# Patient Record
Sex: FEMALE | Race: WHITE
[De-identification: names, ages, dates, MRNs, and addresses within clinical notes are randomized per-mention and may not be internally consistent; named-entity substitution may affect disease eponyms.]

---

## 2017-09-25 ENCOUNTER — HOSPITAL ENCOUNTER (OUTPATIENT)
Dept: HOSPITAL 62 - 2S | Age: 27
Setting detail: OBSERVATION
LOS: 2 days | Discharge: HOME | End: 2017-09-27
Attending: OBSTETRICS & GYNECOLOGY | Admitting: OBSTETRICS & GYNECOLOGY
Payer: COMMERCIAL

## 2017-09-25 DIAGNOSIS — Z3A.01: ICD-10-CM

## 2017-09-25 DIAGNOSIS — O23.01: Primary | ICD-10-CM

## 2017-09-25 LAB
ANION GAP SERPL CALC-SCNC: 12 MMOL/L (ref 5–19)
APPEARANCE UR: (no result)
BASOPHILS # BLD AUTO: 0.1 10^3/UL (ref 0–0.2)
BASOPHILS NFR BLD AUTO: 0.6 % (ref 0–2)
BILIRUB UR QL STRIP: NEGATIVE
BUN SERPL-MCNC: 10 MG/DL (ref 7–20)
CALCIUM: 9.3 MG/DL (ref 8.4–10.2)
CHLORIDE SERPL-SCNC: 106 MMOL/L (ref 98–107)
CO2 SERPL-SCNC: 19 MMOL/L (ref 22–30)
CREAT SERPL-MCNC: 0.92 MG/DL (ref 0.52–1.25)
EOSINOPHIL # BLD AUTO: 0.1 10^3/UL (ref 0–0.6)
EOSINOPHIL NFR BLD AUTO: 0.5 % (ref 0–6)
ERYTHROCYTE [DISTWIDTH] IN BLOOD BY AUTOMATED COUNT: 14.8 % (ref 11.5–14)
GLUCOSE SERPL-MCNC: 103 MG/DL (ref 75–110)
GLUCOSE UR STRIP-MCNC: NEGATIVE MG/DL
HCT VFR BLD CALC: 39.8 % (ref 36–47)
HGB BLD-MCNC: 13.7 G/DL (ref 12–15.5)
HGB HCT DIFFERENCE: 1.3
KETONES UR STRIP-MCNC: (no result) MG/DL
LYMPHOCYTES # BLD AUTO: 1.5 10^3/UL (ref 0.5–4.7)
LYMPHOCYTES NFR BLD AUTO: 11 % (ref 13–45)
MCH RBC QN AUTO: 27.6 PG (ref 27–33.4)
MCHC RBC AUTO-ENTMCNC: 34.4 G/DL (ref 32–36)
MCV RBC AUTO: 80 FL (ref 80–97)
MONOCYTES # BLD AUTO: 0.7 10^3/UL (ref 0.1–1.4)
MONOCYTES NFR BLD AUTO: 5.2 % (ref 3–13)
NEUTROPHILS # BLD AUTO: 11.3 10^3/UL (ref 1.7–8.2)
NEUTS SEG NFR BLD AUTO: 82.7 % (ref 42–78)
NITRITE UR QL STRIP: NEGATIVE
PH UR STRIP: 5 [PH] (ref 5–9)
POTASSIUM SERPL-SCNC: 3.9 MMOL/L (ref 3.6–5)
PROT UR STRIP-MCNC: NEGATIVE MG/DL
RBC # BLD AUTO: 4.97 10^6/UL (ref 3.72–5.28)
SODIUM SERPL-SCNC: 136.7 MMOL/L (ref 137–145)
SP GR UR STRIP: 1.03
UROBILINOGEN UR-MCNC: NEGATIVE MG/DL (ref ?–2)
WBC # BLD AUTO: 13.7 10^3/UL (ref 4–10.5)

## 2017-09-25 PROCEDURE — G0379 DIRECT REFER HOSPITAL OBSERV: HCPCS

## 2017-09-25 PROCEDURE — 81001 URINALYSIS AUTO W/SCOPE: CPT

## 2017-09-25 PROCEDURE — G0378 HOSPITAL OBSERVATION PER HR: HCPCS

## 2017-09-25 PROCEDURE — 87040 BLOOD CULTURE FOR BACTERIA: CPT

## 2017-09-25 PROCEDURE — 83605 ASSAY OF LACTIC ACID: CPT

## 2017-09-25 PROCEDURE — 87086 URINE CULTURE/COLONY COUNT: CPT

## 2017-09-25 PROCEDURE — 36415 COLL VENOUS BLD VENIPUNCTURE: CPT

## 2017-09-25 PROCEDURE — 85025 COMPLETE CBC W/AUTO DIFF WBC: CPT

## 2017-09-25 PROCEDURE — 80048 BASIC METABOLIC PNL TOTAL CA: CPT

## 2017-09-25 PROCEDURE — S0119 ONDANSETRON 4 MG: HCPCS

## 2017-09-25 RX ADMIN — ZOLPIDEM TARTRATE PRN MG: 5 TABLET ORAL at 23:59

## 2017-09-25 RX ADMIN — ONDANSETRON PRN MG: 4 TABLET, ORALLY DISINTEGRATING ORAL at 22:26

## 2017-09-25 RX ADMIN — OXYCODONE AND ACETAMINOPHEN PRN TAB: 5; 325 TABLET ORAL at 22:18

## 2017-09-26 RX ADMIN — ONDANSETRON PRN MG: 4 TABLET, ORALLY DISINTEGRATING ORAL at 21:50

## 2017-09-26 RX ADMIN — CEFTRIAXONE SCH ML: 2 INJECTION, SOLUTION INTRAVENOUS at 10:04

## 2017-09-26 RX ADMIN — OXYCODONE AND ACETAMINOPHEN PRN TAB: 5; 325 TABLET ORAL at 20:56

## 2017-09-26 RX ADMIN — OXYCODONE AND ACETAMINOPHEN PRN TAB: 5; 325 TABLET ORAL at 02:19

## 2017-09-26 RX ADMIN — Medication SCH ML: at 21:46

## 2017-09-26 RX ADMIN — ONDANSETRON PRN MG: 4 TABLET, ORALLY DISINTEGRATING ORAL at 13:38

## 2017-09-26 RX ADMIN — Medication SCH ML: at 10:03

## 2017-09-26 RX ADMIN — OXYCODONE AND ACETAMINOPHEN PRN TAB: 5; 325 TABLET ORAL at 07:55

## 2017-09-26 RX ADMIN — OXYCODONE AND ACETAMINOPHEN PRN TAB: 5; 325 TABLET ORAL at 15:44

## 2017-09-26 NOTE — PDOC PROGRESS REPORT
Subjective


Subjective:: 





Pt reporting still having moderate CVA pain, no n/v


No vaginal bleeding





Physical Exam





- Physical Exam


Vital Signs: 


 











Temp Pulse Resp BP Pulse Ox


 


 98.6 F   86   16   114/78   97 


 


 09/26/17 04:04  09/26/17 04:04  09/26/17 04:04  09/26/17 04:04  09/26/17 04:04








 Intake & Output











 09/25/17 09/26/17 09/27/17





 06:59 06:59 06:59


 


Weight  99.6 kg 











General appearance: PRESENT: no acute distress, cooperative, well-nourished


GI/Abdominal exam: PRESENT: normal bowel sounds, soft - Moderate Right CVA 

tenderness, tenderness





Result


Laboratory Results: 


 





 09/25/17 22:20 





 09/25/17 22:20 





 











  09/25/17 09/25/17 09/25/17





  22:20 22:20 22:20


 


WBC  13.7 H  


 


RBC  4.97  


 


Hgb  13.7  


 


Hct  39.8  


 


MCV  80  


 


MCH  27.6  


 


MCHC  34.4  


 


RDW  14.8 H  


 


Plt Count  219  


 


Seg Neutrophils %  82.7 H  


 


Lymphocytes %  11.0 L  


 


Monocytes %  5.2  


 


Eosinophils %  0.5  


 


Basophils %  0.6  


 


Absolute Neutrophils  11.3 H  


 


Absolute Lymphocytes  1.5  


 


Absolute Monocytes  0.7  


 


Absolute Eosinophils  0.1  


 


Absolute Basophils  0.1  


 


Sodium   136.7 L 


 


Potassium   3.9 


 


Chloride   106 


 


Carbon Dioxide   19 L 


 


Anion Gap   12 


 


BUN   10 


 


Creatinine   0.92 


 


Est GFR ( Amer)   > 60 


 


Est GFR (Non-Af Amer)   > 60 


 


Glucose   103 


 


Lactic Acid    0.9


 


Calcium   9.3 


 


Urine Color   


 


Urine Appearance   


 


Urine pH   


 


Ur Specific Gravity   


 


Urine Protein   


 


Urine Glucose (UA)   


 


Urine Ketones   


 


Urine Blood   


 


Urine Nitrite   


 


Ur Leukocyte Esterase   


 


Urine WBC (Auto)   


 


Urine RBC (Auto)   














  09/25/17





  22:42


 


WBC 


 


RBC 


 


Hgb 


 


Hct 


 


MCV 


 


MCH 


 


MCHC 


 


RDW 


 


Plt Count 


 


Seg Neutrophils % 


 


Lymphocytes % 


 


Monocytes % 


 


Eosinophils % 


 


Basophils % 


 


Absolute Neutrophils 


 


Absolute Lymphocytes 


 


Absolute Monocytes 


 


Absolute Eosinophils 


 


Absolute Basophils 


 


Sodium 


 


Potassium 


 


Chloride 


 


Carbon Dioxide 


 


Anion Gap 


 


BUN 


 


Creatinine 


 


Est GFR ( Amer) 


 


Est GFR (Non-Af Amer) 


 


Glucose 


 


Lactic Acid 


 


Calcium 


 


Urine Color  YELLOW


 


Urine Appearance  SLIGHTLY-CLOUDY


 


Urine pH  5.0


 


Ur Specific Gravity  1.031


 


Urine Protein  NEGATIVE


 


Urine Glucose (UA)  NEGATIVE


 


Urine Ketones  TRACE H


 


Urine Blood  NEGATIVE


 


Urine Nitrite  NEGATIVE


 


Ur Leukocyte Esterase  NEGATIVE


 


Urine WBC (Auto)  2


 


Urine RBC (Auto)  1














Assessment & Plan





- Diagnosis


(1) Kidney infection in mother during first trimester of pregnancy


Is this a current diagnosis for this admission?: Yes   





- Time


Time Spent with patient: Less than 15 minutes


Medications reviewed and adjusted accordingly: Yes


Anticipated discharge: Home


Within: within 48 hours - Continue IV Abx Recheck cbc in AM, await urine Cx 

results

## 2017-09-27 VITALS — DIASTOLIC BLOOD PRESSURE: 70 MMHG | SYSTOLIC BLOOD PRESSURE: 115 MMHG

## 2017-09-27 RX ADMIN — CEFTRIAXONE SCH ML: 2 INJECTION, SOLUTION INTRAVENOUS at 10:24

## 2017-09-27 RX ADMIN — ZOLPIDEM TARTRATE PRN MG: 5 TABLET ORAL at 02:17

## 2017-09-27 RX ADMIN — Medication SCH ML: at 06:08

## 2017-11-09 ENCOUNTER — HOSPITAL ENCOUNTER (OUTPATIENT)
Dept: HOSPITAL 62 - LAB | Age: 27
End: 2017-11-09
Attending: OBSTETRICS & GYNECOLOGY
Payer: COMMERCIAL

## 2017-11-09 DIAGNOSIS — J02.0: Primary | ICD-10-CM

## 2017-11-09 PROCEDURE — 87880 STREP A ASSAY W/OPTIC: CPT

## 2018-05-17 ENCOUNTER — HOSPITAL ENCOUNTER (INPATIENT)
Dept: HOSPITAL 62 - LC | Age: 28
LOS: 3 days | Discharge: HOME | End: 2018-05-20
Attending: OBSTETRICS & GYNECOLOGY | Admitting: OBSTETRICS & GYNECOLOGY
Payer: COMMERCIAL

## 2018-05-17 DIAGNOSIS — O36.63X0: Primary | ICD-10-CM

## 2018-05-17 DIAGNOSIS — Z3A.39: ICD-10-CM

## 2018-05-17 DIAGNOSIS — Z87.59: ICD-10-CM

## 2018-05-17 LAB
APPEARANCE UR: (no result)
APTT PPP: YELLOW S
BARBITURATES UR QL SCN: NEGATIVE
BILIRUB UR QL STRIP: NEGATIVE
GLUCOSE UR STRIP-MCNC: NEGATIVE MG/DL
KETONES UR STRIP-MCNC: (no result) MG/DL
METHADONE UR QL SCN: NEGATIVE
NITRITE UR QL STRIP: NEGATIVE
PCP UR QL SCN: NEGATIVE
PH UR STRIP: 6 [PH] (ref 5–9)
PROT UR STRIP-MCNC: 30 MG/DL
SP GR UR STRIP: 1.03
URINE AMPHETAMINES SCREEN: NEGATIVE
URINE BENZODIAZEPINES SCREEN: NEGATIVE
URINE COCAINE SCREEN: NEGATIVE
URINE MARIJUANA (THC) SCREEN: NEGATIVE
UROBILINOGEN UR-MCNC: 2 MG/DL (ref ?–2)

## 2018-05-17 PROCEDURE — 36415 COLL VENOUS BLD VENIPUNCTURE: CPT

## 2018-05-17 PROCEDURE — 86901 BLOOD TYPING SEROLOGIC RH(D): CPT

## 2018-05-17 PROCEDURE — 85027 COMPLETE CBC AUTOMATED: CPT

## 2018-05-17 PROCEDURE — 86900 BLOOD TYPING SEROLOGIC ABO: CPT

## 2018-05-17 PROCEDURE — 87880 STREP A ASSAY W/OPTIC: CPT

## 2018-05-17 PROCEDURE — 86850 RBC ANTIBODY SCREEN: CPT

## 2018-05-17 PROCEDURE — 87804 INFLUENZA ASSAY W/OPTIC: CPT

## 2018-05-17 PROCEDURE — 81005 URINALYSIS: CPT

## 2018-05-17 PROCEDURE — 80307 DRUG TEST PRSMV CHEM ANLYZR: CPT

## 2018-05-17 PROCEDURE — 87070 CULTURE OTHR SPECIMN AEROBIC: CPT

## 2018-05-17 PROCEDURE — 85025 COMPLETE CBC W/AUTO DIFF WBC: CPT

## 2018-05-18 LAB
ABSOLUTE LYMPHOCYTES# (MANUAL): 0.3 10^3/UL (ref 0.5–4.7)
ABSOLUTE MONOCYTES # (MANUAL): 0.2 10^3/UL (ref 0.1–1.4)
ABSOLUTE NEUTROPHILS# (MANUAL): 9.1 10^3/UL (ref 1.7–8.2)
ADD MANUAL DIFF: YES
ANISOCYTOSIS BLD QL SMEAR: SLIGHT
BASOPHILS NFR BLD MANUAL: 0 % (ref 0–2)
DACRYOCYTES BLD QL SMEAR: SLIGHT
EOSINOPHIL NFR BLD MANUAL: 0 % (ref 0–6)
ERYTHROCYTE [DISTWIDTH] IN BLOOD BY AUTOMATED COUNT: 15.6 % (ref 11.5–14)
HCT VFR BLD CALC: 33.3 % (ref 36–47)
HGB BLD-MCNC: 11.3 G/DL (ref 12–15.5)
MCH RBC QN AUTO: 29.5 PG (ref 27–33.4)
MCHC RBC AUTO-ENTMCNC: 34 G/DL (ref 32–36)
MCV RBC AUTO: 87 FL (ref 80–97)
MONOCYTES % (MANUAL): 2 % (ref 3–13)
NEUTS BAND NFR BLD MANUAL: 2 % (ref 3–5)
OVALOCYTES BLD QL SMEAR: SLIGHT
PLATELET # BLD: 134 10^3/UL (ref 150–450)
PLATELET COMMENT: ADEQUATE
POIKILOCYTOSIS BLD QL SMEAR: SLIGHT
RBC # BLD AUTO: 3.83 10^6/UL (ref 3.72–5.28)
SEGMENTED NEUTROPHILS % (MAN): 93 % (ref 42–78)
TOTAL CELLS COUNTED BLD: 100
TOXIC GRANULES BLD QL SMEAR: SLIGHT
VARIANT LYMPHS NFR BLD MANUAL: 3 % (ref 13–45)
WBC # BLD AUTO: 9.6 10^3/UL (ref 4–10.5)

## 2018-05-18 PROCEDURE — 4A1HXCZ MONITORING OF PRODUCTS OF CONCEPTION, CARDIAC RATE, EXTERNAL APPROACH: ICD-10-PCS | Performed by: OBSTETRICS & GYNECOLOGY

## 2018-05-18 RX ADMIN — OXYCODONE AND ACETAMINOPHEN PRN TAB: 5; 325 TABLET ORAL at 19:16

## 2018-05-18 RX ADMIN — HYDROMORPHONE HYDROCHLORIDE ONE MG: 2 INJECTION INTRAMUSCULAR; INTRAVENOUS; SUBCUTANEOUS at 10:50

## 2018-05-18 RX ADMIN — CEFTRIAXONE SODIUM SCH MG: 1 INJECTION, POWDER, FOR SOLUTION INTRAMUSCULAR; INTRAVENOUS at 22:33

## 2018-05-18 RX ADMIN — HYDROMORPHONE HYDROCHLORIDE ONE MG: 2 INJECTION INTRAMUSCULAR; INTRAVENOUS; SUBCUTANEOUS at 11:56

## 2018-05-18 RX ADMIN — OXYCODONE AND ACETAMINOPHEN PRN TAB: 5; 325 TABLET ORAL at 13:28

## 2018-05-18 RX ADMIN — Medication SCH CAP: at 13:14

## 2018-05-18 RX ADMIN — DOCUSATE SODIUM SCH MG: 100 CAPSULE, LIQUID FILLED ORAL at 13:14

## 2018-05-18 RX ADMIN — DOCUSATE SODIUM SCH MG: 100 CAPSULE, LIQUID FILLED ORAL at 17:29

## 2018-05-18 RX ADMIN — MORPHINE SULFATE PRN MG: 10 INJECTION INTRAMUSCULAR; INTRAVENOUS; SUBCUTANEOUS at 15:25

## 2018-05-18 NOTE — OPERATIVE REPORT E
Operative Report



NAME: MARTI LEW

MRN:  [id number]          : 1990 AGE:  28Y

DATE OF SURGERY: 2018               ROOM: 



PREOPERATIVE DIAGNOSIS:

IUP at term with prior history of shoulder dystocia.



POSTOPERATIVE DIAGNOSIS:

IUP at term with prior history of shoulder dystocia.



OPERATION:

Primary low transverse , delivery of a viable male, 9 pounds 7

ounces, 8 and 9 Apgars.



SURGEON:

RE CROCKETT M.D.



ANESTHESIA:

Spinal



ESTIMATED BLOOD LOSS:

Approximately 1500 mL.



TISSUE REMOVED OR ALTERED:

Placenta.



PROCEDURE:

The patient was placed in the supine position, rolled on her right side,

prepped and draped in sterile fashion.



A Pfannenstiel incision was made.  The incision extended through the

subcutaneous tissue and fascia with sharp dissection.  Fascia sharply

divided.  Rectus muscle bluntly and sharply divided.  Parietal peritoneum

was entered with sharp dissection.  The uterus was nicked in the midline

and extended bilaterally.  The infant was delivered through the uterine

and abdominal incision.  Nose and mouth suctioned with bulb syringe.  Cord

was clamped and infant was passed from the table.  Placenta was manually

extracted.  Uterus closed in 2 layers using 0 Vicryl, first in a running

stitch, then a second a Lembert stitch imbricating the first layer.  There

was a mild amount of bleeding noted on the left and was controlled with a

figure-of-eight suture of 0 Vicryl.  The fascia was closed with 0 Vicryl

and the skin was closed with subcuticular absorbable staples.  Patient

tolerated it well, was taken to recovery in good condition and infant to

the  nursery in good condition.





DICTATING PHYSICIAN:  RE CROCKETT M.D.





[id number]                  DT: [id number]

PHY#: [id number]            DD: [id number]

ID:   [id number]           JOB#: [id number]       ACCT: [id number]



cc:RE CROCKETT M.D.

>

## 2018-05-18 NOTE — ADMISSION PHYSICAL
=================================================================



=================================================================

Datetime Report Generated by CPN: 2018 01:11

   

   

=================================================================

CURRENT ADMISSION

=================================================================

   

Chief Complaint:  Other

Chief Complaint Other:  Large baby with efw 4400.  She is concerned

   about shoulder dystocia and would prefer a c section over labor or

   induction.  She reports having problems with delivery of the

   shoulders of a smaller baby.

Indication for Induction:  Not Applicable

Admit Impression :  Term, Intrauterine Pregnancy

Admit Plan:  Admit to Unit; Initiate  Section Protocol

   

=================================================================

ALLERGIES

=================================================================

   

Medication Allergies:  No

Medication Allergies:  No Known Allergies (2016)

Latex:  No Latex Allergies

   

=================================================================

OBSTETRICAL HISTORY

=================================================================

   

EDC:  2018 00:00

:  4

Para:  2

Term:  2

:  0

SAB:  1

IAB:  0

Ectopic:  0

Livin

Cesareans:  0

VBACs:  0

Multiple Births:  0

   

=================================================================

PHYSICAL EXAM

=================================================================

   

General:  Normal

HEENT:  Normal

Neurologic:  Normal

Thyroid:  Normal

Heart:  Normal

Lungs:  Normal

Breast:  Deferred

Back:  Normal

Abdomen:  Normal

Genitourinary Exam:  Normal

Extremities:  Normal

DTRs:  Normal

Pelvic Type:  Adequate

Vital Signs:  Reviewed

   

=================================================================

VAGINAL EXAM

=================================================================

   

Dilatation:  1

Effacement:  25

Station:  -4

   

=================================================================

MEMBRANES

=================================================================

   

Pooling:  Negative

Membranes:  Intact

   

=================================================================

FETUS A

=================================================================

   

EGA:  39.2

Monitoring:  External US

FHR- Baseline:  140

Variability:  Moderate 6-25bpm

FHR Category:  Category I

Estimated Fetal Weight (gm):  4400

Fetal Presentation:  Vertex

Admit Comment:  She would prefer a c section over induction or natural

   labor.  We will plan for the am.

   

=================================================================

INFORMED CONSENT

=================================================================

   

Signature:  Electronically signed by Missael Gonsales MD ([email or web address]) on

   2018 at 01:11  with User ID: [id number]

## 2018-05-19 LAB
A TYPE INFLUENZA AG: NEGATIVE
B INFLUENZA AG: NEGATIVE
ERYTHROCYTE [DISTWIDTH] IN BLOOD BY AUTOMATED COUNT: 15.8 % (ref 11.5–14)
HCT VFR BLD CALC: 28.7 % (ref 36–47)
HGB BLD-MCNC: 10.1 G/DL (ref 12–15.5)
MCH RBC QN AUTO: 30 PG (ref 27–33.4)
MCHC RBC AUTO-ENTMCNC: 35 G/DL (ref 32–36)
MCV RBC AUTO: 86 FL (ref 80–97)
PLATELET # BLD: 122 10^3/UL (ref 150–450)
RBC # BLD AUTO: 3.35 10^6/UL (ref 3.72–5.28)
WBC # BLD AUTO: 8.6 10^3/UL (ref 4–10.5)

## 2018-05-19 RX ADMIN — OXYCODONE AND ACETAMINOPHEN PRN TAB: 5; 325 TABLET ORAL at 00:10

## 2018-05-19 RX ADMIN — OXYCODONE AND ACETAMINOPHEN PRN TAB: 5; 325 TABLET ORAL at 22:44

## 2018-05-19 RX ADMIN — MORPHINE SULFATE PRN MG: 10 INJECTION INTRAMUSCULAR; INTRAVENOUS; SUBCUTANEOUS at 04:25

## 2018-05-19 RX ADMIN — OXYCODONE AND ACETAMINOPHEN PRN TAB: 5; 325 TABLET ORAL at 14:48

## 2018-05-19 RX ADMIN — Medication SCH CAP: at 09:23

## 2018-05-19 RX ADMIN — CEFTRIAXONE SODIUM SCH MG: 1 INJECTION, POWDER, FOR SOLUTION INTRAMUSCULAR; INTRAVENOUS at 09:22

## 2018-05-19 RX ADMIN — DOCUSATE SODIUM SCH MG: 100 CAPSULE, LIQUID FILLED ORAL at 17:12

## 2018-05-19 RX ADMIN — OXYCODONE AND ACETAMINOPHEN PRN TAB: 5; 325 TABLET ORAL at 18:41

## 2018-05-19 RX ADMIN — OXYCODONE AND ACETAMINOPHEN PRN TAB: 5; 325 TABLET ORAL at 10:23

## 2018-05-19 RX ADMIN — OXYCODONE AND ACETAMINOPHEN PRN TAB: 5; 325 TABLET ORAL at 06:33

## 2018-05-19 RX ADMIN — CEFTRIAXONE SODIUM SCH MG: 1 INJECTION, POWDER, FOR SOLUTION INTRAMUSCULAR; INTRAVENOUS at 22:22

## 2018-05-19 RX ADMIN — DOCUSATE SODIUM SCH MG: 100 CAPSULE, LIQUID FILLED ORAL at 09:23

## 2018-05-20 VITALS — SYSTOLIC BLOOD PRESSURE: 136 MMHG | DIASTOLIC BLOOD PRESSURE: 84 MMHG

## 2018-05-20 RX ADMIN — IBUPROFEN SCH MG: 800 TABLET, FILM COATED ORAL at 11:48

## 2018-05-20 RX ADMIN — OXYCODONE AND ACETAMINOPHEN PRN TAB: 5; 325 TABLET ORAL at 02:54

## 2018-05-20 RX ADMIN — OXYCODONE AND ACETAMINOPHEN PRN TAB: 5; 325 TABLET ORAL at 07:29

## 2018-05-20 RX ADMIN — DOCUSATE SODIUM SCH MG: 100 CAPSULE, LIQUID FILLED ORAL at 09:30

## 2018-05-20 RX ADMIN — Medication SCH CAP: at 09:30

## 2018-05-20 RX ADMIN — IBUPROFEN SCH MG: 800 TABLET, FILM COATED ORAL at 06:43

## 2018-05-20 NOTE — PDOC DISCHARGE SUMMARY
Final Diagnosis


Discharge Date: 18





- Final Diagnosis


(1)  delivery delivered


Is this a current diagnosis for this admission?: Yes   





(2) Gestational hypertension with significant proteinuria, delivered


Is this a current diagnosis for this admission?: Yes   





(3) Kidney infection in mother during first trimester of pregnancy


Is this a current diagnosis for this admission?: Yes   





Discharge Data





- Discharge Medication


Prescriptions: 


Oxycodone HCl/Acetaminophen [Percocet 5-325 mg Tablet] 2 tab PO Q4HP PRN #30 

tablet


 PRN Reason: 


Docusate Sodium [Colace 100 mg Capsule] 100 mg PO BID #60 capsule


Ferrous Sulfate 325 mg PO BID #60 tablet.


Ibuprofen [Motrin 800 mg Tablet] 800 mg PO Q6 #60 tablet


Home Medications: 








Docusate Sodium [Colace 100 mg Capsule] 100 mg PO BID #60 capsule 18 


Ferrous Sulfate 325 mg PO BID #60 tablet. 18 


Ibuprofen [Motrin 800 mg Tablet] 800 mg PO Q6 #60 tablet 18 


Oxycodone HCl/Acetaminophen [Percocet 5-325 mg Tablet] 2 tab PO Q4HP PRN #30 

tablet 18 








Gestational Age: 39


Reason(s) for Admission: Ceasarean Section-Primary


Prenatal Procedures: NST


Intrapartum Procedure(s): : Low Cervical, Transverse





-  Data


  ** Baby 1 Male


Apgar at 1 minute: 8


Apgar at 5 minutes: 9


Weight: 4.281 kg


Home with Mother: Yes


Complications: No





- Diagnosis Test


Laboratory: 


 











Temp Pulse Resp BP Pulse Ox


 


 98.3 F   115 H  18   126/69 H  97 


 


 18 00:08  18 23:46  18 23:46  18 23:46  18 23:46








 











  18





  22:59 00:02 06:10


 


RBC   3.83  3.35 L


 


Hgb   11.3 L  10.1 L


 


Hct   33.3 L  28.7 L


 


Urine Opiates Screen  NEGATIVE  














- Discharge information/Instructions


Discharge Activity: Activity As Tolerated, Pelvic Rest, No tub bath


Discharge Diet: Regular


Disposition: HOME, SELF-CARE


Follow up with: [facility]


in: 1, Weeks

## 2019-08-20 NOTE — PDOC PROGRESS REPORT
Subjective-OB


Progress Note for:: 18


Subjective: 





s/p c/s day #1,





Denies concerns, states lochia is moderately stable, pain is well controlled, 

crawford to gravity, still achy . 





Physical Exam (OB)


Vital Signs: 


 











Temp Pulse Resp BP Pulse Ox


 


 98.8 F   134 H  20   111/75   98 


 


 18 07:55  18 07:55  18 07:55  18 07:55  18 07:55








 Intake & Output











 18





 06:59 06:59 06:59


 


Intake Total  3568 1240


 


Output Total  5752 


 


Balance  -2184 1240


 


Weight 120.6 kg  














- PIH/Pre-Eclampsia


DTR's: 2 +


Clonus: Negative


Headache: Absent


Epigastric Pain: No


Visual Changes: No





- 


Dressing Removed: No


Incision: Dressing


Closure Type: Sutures





- Lochia


Lochia Amount: Scant < 10 ml


Lochia Color: Rubra/Red





- Abdomen


Description: Tender, Soft, Round


Hernia Present: No


Fundal Description: Firm, Midline


Fundal Height: u/u - u/2





Objective-Diagnostic


Laboratory: 


 





 18 06:10 





 











  18





  06:10


 


WBC  8.6


 


RBC  3.35 L


 


Hgb  10.1 L


 


Hct  28.7 L


 


MCV  86


 


MCH  30.0


 


MCHC  35.0


 


RDW  15.8 H


 


Plt Count  122 L














Assessment and Plan(PN)





- Assessment and Plan


(1)  delivery delivered


Is this a current diagnosis for this admission?: Yes   


Plan: 


routine postop care











(2) Gestational hypertension with significant proteinuria, delivered


Is this a current diagnosis for this admission?: Yes   


Plan: 


monitor








(3) Kidney infection in mother during first trimester of pregnancy


Is this a current diagnosis for this admission?: Yes   


Plan: 


monitor


continue abx








- Time Spent with Patient


Time with patient: Less than 15 minutes


Critical Time spent with patient: Less than 15 minutes


Medications reviewed and adjusted accordingly: Yes





- Disposition


Anticipated Discharge: Home


Within: within 24 hours Is This A New Presentation, Or A Follow-Up?: Follow Up Isotretinoin Display Cumulative Dose In The Note?: Yes Patient Reported Weight In Pounds (Required For Calculation): 180 When Was Isotretinoin Started?: 6/2019